# Patient Record
Sex: MALE | URBAN - METROPOLITAN AREA
[De-identification: names, ages, dates, MRNs, and addresses within clinical notes are randomized per-mention and may not be internally consistent; named-entity substitution may affect disease eponyms.]

---

## 2022-05-04 ENCOUNTER — OFFICE VISIT (OUTPATIENT)
Dept: URBAN - METROPOLITAN AREA CLINIC 45 | Facility: CLINIC | Age: 62
End: 2022-05-04

## 2022-05-04 DIAGNOSIS — H40.033 ANATOMICAL NARROW ANGLE, BILATERAL: Primary | ICD-10-CM

## 2022-05-04 DIAGNOSIS — H43.11 VITREOUS HEMORRHAGE, RIGHT EYE: ICD-10-CM

## 2022-05-04 PROCEDURE — 92020 GONIOSCOPY: CPT | Performed by: OPTOMETRIST

## 2022-05-04 PROCEDURE — 99204 OFFICE O/P NEW MOD 45 MIN: CPT | Performed by: OPTOMETRIST

## 2022-05-04 PROCEDURE — 92134 CPTRZ OPH DX IMG PST SGM RTA: CPT | Performed by: OPTOMETRIST

## 2022-05-04 ASSESSMENT — INTRAOCULAR PRESSURE
OS: 15
OD: 17

## 2022-05-04 NOTE — IMPRESSION/PLAN
Impression: Vitreous hemorrhage, right eye: H43.11.  Plan: return for retina consult

emphasized BS control

## 2022-07-25 ENCOUNTER — OFFICE VISIT (OUTPATIENT)
Dept: URBAN - METROPOLITAN AREA CLINIC 33 | Facility: CLINIC | Age: 62
End: 2022-07-25
Payer: COMMERCIAL

## 2022-07-25 DIAGNOSIS — H43.11 VITREOUS HEMORRHAGE, RIGHT EYE: ICD-10-CM

## 2022-07-25 DIAGNOSIS — E11.3591 TYPE 2 DIABETES MELLITUS W/ PROLIFERATIVE DIABETIC RETINOPATHY W/O MACULAR EDEMA, RIGHT EYE: Primary | ICD-10-CM

## 2022-07-25 DIAGNOSIS — E11.3392 TYPE 2 DIAB W MODERATE NONPRLF DIAB RTNOP W/O MACULAR EDEMA, LEFT EYE: ICD-10-CM

## 2022-07-25 PROCEDURE — 92004 COMPRE OPH EXAM NEW PT 1/>: CPT | Performed by: OPHTHALMOLOGY

## 2022-07-25 PROCEDURE — 92134 CPTRZ OPH DX IMG PST SGM RTA: CPT | Performed by: OPHTHALMOLOGY

## 2022-07-25 PROCEDURE — 67028 INJECTION EYE DRUG: CPT | Performed by: OPHTHALMOLOGY

## 2022-07-25 ASSESSMENT — INTRAOCULAR PRESSURE
OD: 20
OS: 20

## 2022-07-25 ASSESSMENT — KERATOMETRY
OD: 43.00
OS: 43.50

## 2022-07-25 NOTE — IMPRESSION/PLAN
Impression: Vitreous hemorrhage, right eye: H43.11. Right. Condition: unstable. Plan: Discussed diagnosis in detail with patient. Discussed risks of progression. Recommend AV Injection OD - see notes above.

## 2022-07-25 NOTE — IMPRESSION/PLAN
Impression: Type 2 diab w moderate nonprlf diab rtnop w/o macular edema, left eye: O68.3371. Left. Plan: Discussed diagnosis in detail with patient. Exam shows minimal Diabetic changes. No treatment is recommended at this time. Emphasized blood sugar control and advised to keep future appointments with PCP and/or Endocrinologist for the management of Diabetes. Recommend observation for now.  OCT shows stable, no edema

## 2022-07-25 NOTE — IMPRESSION/PLAN
Impression: Type 2 diabetes mellitus w/ proliferative diabetic retinopathy w/o macular edema, right eye: e11.3591. Right. Condition: unstable. Plan: Discussed diagnosis in detail with patient. Discussed risks of progression. Discussed 3 options with patient, medication injections into the eye, laser or surgery in the right eye to help remove the blood for vision improvement. Recommend to start with injection and see how the eye responds. Recommend to start with ENA tx RIGHT EYE with AVASTIN to help reduce the bleeding in order to prevent a further reduction in vision. Discussed the risks and benefits of tx. All questions answered. Patient elects to proceed with recommendation. May need to proceed with laser after the injection once the eye heals, or may need to proceed with surgery, depends on how the eye responds to the medication. OCT OD shows vitreous opacities Patient understands that additional ENA treatments or laser treatments may be needed in the future.

## 2022-09-01 ENCOUNTER — OFFICE VISIT (OUTPATIENT)
Dept: URBAN - METROPOLITAN AREA CLINIC 33 | Facility: CLINIC | Age: 62
End: 2022-09-01
Payer: COMMERCIAL

## 2022-09-01 DIAGNOSIS — E11.3591 TYPE 2 DIABETES MELLITUS W/ PROLIFERATIVE DIABETIC RETINOPATHY W/O MACULAR EDEMA, RIGHT EYE: Primary | ICD-10-CM

## 2022-09-01 DIAGNOSIS — H43.11 VITREOUS HEMORRHAGE, RIGHT EYE: ICD-10-CM

## 2022-09-01 DIAGNOSIS — E11.3392 TYPE 2 DIAB W MODERATE NONPRLF DIAB RTNOP W/O MACULAR EDEMA, LEFT EYE: ICD-10-CM

## 2022-09-01 PROCEDURE — 92014 COMPRE OPH EXAM EST PT 1/>: CPT | Performed by: OPHTHALMOLOGY

## 2022-09-01 PROCEDURE — 92134 CPTRZ OPH DX IMG PST SGM RTA: CPT | Performed by: OPHTHALMOLOGY

## 2022-09-01 ASSESSMENT — INTRAOCULAR PRESSURE
OS: 21
OD: 20

## 2022-09-01 NOTE — IMPRESSION/PLAN
Impression: Vitreous hemorrhage, right eye: H43.11. Right. Condition: unstable. Plan: Discussed diagnosis in detail with patient. Discussed risks of progression. Recommend PRP OD - see notes above.

## 2022-09-01 NOTE — IMPRESSION/PLAN
Impression: Type 2 diab w moderate nonprlf diab rtnop w/o macular edema, left eye: K97.5951. Left. Plan: Discussed diagnosis in detail with patient. Exam shows minimal Diabetic changes. No treatment is recommended at this time. Emphasized blood sugar control and advised to keep future appointments with PCP and/or Endocrinologist for the management of Diabetes. Recommend observation for now.  OCT and Optos shows stable, no edema

## 2022-09-01 NOTE — IMPRESSION/PLAN
Impression: Type 2 diabetes mellitus w/ proliferative diabetic retinopathy w/o macular edema, right eye: e11.3591. Right. Condition: improving . s/p AV OD 07/25/22 Plan: Discussed diagnosis in detail with patient. Discussed risks of progression. Recommend Pan-Retinal Photocoagulation laser treatment PRP OD to control the bleeding and control new blood vessel growth in order to prevent a further reduction in vision. Discussed risks/benefits of laser TX. All questions answered. Patient elects to proceed with recommendation. RL1 Patient understands that additional ENA treatments or laser treatments may be needed in the future. OCT performed today: stable, no edema, Optos shows occasional peripheral hemorrhage.

## 2022-09-28 ENCOUNTER — POST-OPERATIVE VISIT (OUTPATIENT)
Dept: URBAN - METROPOLITAN AREA CLINIC 45 | Facility: CLINIC | Age: 62
End: 2022-09-28
Payer: COMMERCIAL

## 2022-09-28 DIAGNOSIS — E11.3591 TYPE 2 DIABETES MELLITUS W/ PROLIFERATIVE DIABETIC RETINOPATHY W/O MACULAR EDEMA, RIGHT EYE: ICD-10-CM

## 2022-09-28 DIAGNOSIS — Z48.810 ENCOUNTER FOR SURGICAL AFTERCARE FOLLOWING SURGERY ON A SENSE ORGAN: Primary | ICD-10-CM

## 2022-09-28 PROCEDURE — 99024 POSTOP FOLLOW-UP VISIT: CPT | Performed by: OPTOMETRIST

## 2022-09-28 ASSESSMENT — INTRAOCULAR PRESSURE
OS: 22
OD: 21

## 2022-09-28 NOTE — IMPRESSION/PLAN
Impression: S/P PRP (Panretinal Photocoagulation laser) OD - 7 Days. Encounter for surgical aftercare following surgery on a sense organ  Z48.810. Post operative instructions reviewed - Plan: continue latanoprost q hs ou --Advised patient to use artificial tears for comfort.

## 2022-09-28 NOTE — IMPRESSION/PLAN
Impression: Type 2 diabetes mellitus w/ proliferative diabetic retinopathy w/o macular edema, right eye: I18.9279 Right.  Plan: sp PRP, pt would like to switch to [place] [id number] location

schedule with Dr. Sulema Randle next available

## 2022-11-03 ENCOUNTER — OFFICE VISIT (OUTPATIENT)
Facility: LOCATION | Age: 62
End: 2022-11-03
Payer: COMMERCIAL

## 2022-11-03 DIAGNOSIS — H43.11 VITREOUS HEMORRHAGE, RIGHT EYE: ICD-10-CM

## 2022-11-03 DIAGNOSIS — E11.3392 TYPE 2 DIAB W MODERATE NONPRLF DIAB RTNOP W/O MACULAR EDEMA, LEFT EYE: ICD-10-CM

## 2022-11-03 DIAGNOSIS — E11.3591 TYPE 2 DIABETES MELLITUS WITH PROLIFERATIVE DIABETIC RETINOPATHY WITHOUT MACULAR EDEMA, RIGHT EYE: Primary | ICD-10-CM

## 2022-11-03 PROCEDURE — 92134 CPTRZ OPH DX IMG PST SGM RTA: CPT | Performed by: OPHTHALMOLOGY

## 2022-11-03 PROCEDURE — 92014 COMPRE OPH EXAM EST PT 1/>: CPT | Performed by: OPHTHALMOLOGY

## 2022-11-03 ASSESSMENT — VISUAL ACUITY
OS: 20/25
OD: 20/50

## 2022-11-03 ASSESSMENT — INTRAOCULAR PRESSURE
OD: 20
OS: 20

## 2022-11-03 NOTE — IMPRESSION/PLAN
Impression: Type 2 diab w moderate nonprlf diab rtnop w/o macular edema, left eye: G76.5696. Left. Plan: No NV by exam , no ME on OCT Discussed diagnosis in detail with patient. Exam shows minimal Diabetic changes. No treatment is recommended at this time. Emphasized blood sugar control and advised to keep future appointments with PCP and/or Endocrinologist for the management of Diabetes. Recommend observation for now. OCT/optos ordered today.  
  6m OCT/exam

## 2022-11-03 NOTE — IMPRESSION/PLAN
Impression: Type 2 diabetes mellitus with proliferative diabetic retinopathy without macular edema, right eye: M59.6470. Plan:  VH s/p PRP (Dr Alfonzo Martini 9/22) -- no new VH on exam, old VH moving inferiorly No ME on OCT Discussed condition/plan with patient. As no new heme on exam and view clearing, will observe today. Discussed with patient he may need additional treatment if worsening. He understands and agrees with plan. Discussed BG/BP control. Note to PCP. OCT/optos ordered today. 
 2m OCT/exam

## 2022-12-29 ENCOUNTER — OFFICE VISIT (OUTPATIENT)
Facility: LOCATION | Age: 62
End: 2022-12-29
Payer: COMMERCIAL

## 2022-12-29 DIAGNOSIS — H43.11 VITREOUS HEMORRHAGE, RIGHT EYE: ICD-10-CM

## 2022-12-29 DIAGNOSIS — E11.3392 TYPE 2 DIAB W MODERATE NONPRLF DIAB RTNOP W/O MACULAR EDEMA, LEFT EYE: ICD-10-CM

## 2022-12-29 DIAGNOSIS — E11.3591 TYPE 2 DIABETES MELLITUS WITH PROLIFERATIVE DIABETIC RETINOPATHY WITHOUT MACULAR EDEMA, RIGHT EYE: Primary | ICD-10-CM

## 2022-12-29 PROCEDURE — 92134 CPTRZ OPH DX IMG PST SGM RTA: CPT | Performed by: OPHTHALMOLOGY

## 2022-12-29 PROCEDURE — 92014 COMPRE OPH EXAM EST PT 1/>: CPT | Performed by: OPHTHALMOLOGY

## 2022-12-29 ASSESSMENT — INTRAOCULAR PRESSURE
OD: 18
OS: 17

## 2022-12-29 NOTE — IMPRESSION/PLAN
Impression: Type 2 diab w moderate nonprlf diab rtnop w/o macular edema, left eye: G02.3202. Left. Plan: No NV by exam , no ME on OCT Discussed diagnosis in detail with patient. Exam shows minimal Diabetic changes. No treatment is recommended at this time. Emphasized blood sugar control and advised to keep future appointments with PCP and/or Endocrinologist for the management of Diabetes. Recommend observation for now. OCT/optos ordered today.  
  6m OCT/exam

## 2022-12-29 NOTE — IMPRESSION/PLAN
Impression: Type 2 diabetes mellitus with proliferative diabetic retinopathy without macular edema, right eye: N60.6573. Plan: Improving VH/VA
 s/p PRP (Dr Alfonzo Martini 9/22) No ME on OCT Discussed condition/plan with patient. As improving exam/VA, will observe today. Discussed with patient he may need additional treatment if worsening. He understands and agrees with plan. Discussed BG/BP control. Note to PCP. OCT/optos ordered today. 
 6m OCT/exam